# Patient Record
Sex: FEMALE | URBAN - METROPOLITAN AREA
[De-identification: names, ages, dates, MRNs, and addresses within clinical notes are randomized per-mention and may not be internally consistent; named-entity substitution may affect disease eponyms.]

---

## 2019-10-01 ENCOUNTER — CONSULT (OUTPATIENT)
Dept: URBAN - METROPOLITAN AREA CLINIC 24 | Facility: CLINIC | Age: 64
End: 2019-10-01
Payer: MEDICARE

## 2019-10-01 DIAGNOSIS — H40.033 ANATOMICAL NARROW ANGLE, BILATERAL: Primary | ICD-10-CM

## 2019-10-01 PROCEDURE — 92004 COMPRE OPH EXAM NEW PT 1/>: CPT | Performed by: OPHTHALMOLOGY

## 2019-10-01 PROCEDURE — 92133 CPTRZD OPH DX IMG PST SGM ON: CPT | Performed by: OPHTHALMOLOGY

## 2019-10-01 PROCEDURE — 92083 EXTENDED VISUAL FIELD XM: CPT | Performed by: OPHTHALMOLOGY

## 2019-10-01 PROCEDURE — 92020 GONIOSCOPY: CPT | Performed by: OPHTHALMOLOGY

## 2019-10-01 PROCEDURE — 92250 FUNDUS PHOTOGRAPHY W/I&R: CPT | Performed by: OPHTHALMOLOGY

## 2019-10-01 PROCEDURE — 76514 ECHO EXAM OF EYE THICKNESS: CPT | Performed by: OPHTHALMOLOGY

## 2019-10-01 ASSESSMENT — INTRAOCULAR PRESSURE
OD: 29
OS: 21

## 2019-12-12 ENCOUNTER — OFFICE VISIT (OUTPATIENT)
Dept: URBAN - METROPOLITAN AREA CLINIC 23 | Facility: CLINIC | Age: 64
End: 2019-12-12
Payer: MEDICARE

## 2019-12-12 PROCEDURE — 92020 GONIOSCOPY: CPT | Performed by: OPHTHALMOLOGY

## 2019-12-12 PROCEDURE — 92133 CPTRZD OPH DX IMG PST SGM ON: CPT | Performed by: OPHTHALMOLOGY

## 2019-12-12 PROCEDURE — 76514 ECHO EXAM OF EYE THICKNESS: CPT | Performed by: OPHTHALMOLOGY

## 2019-12-12 PROCEDURE — 92004 COMPRE OPH EXAM NEW PT 1/>: CPT | Performed by: OPHTHALMOLOGY

## 2019-12-12 RX ORDER — LATANOPROST 50 UG/ML
0.005 % SOLUTION OPHTHALMIC
Qty: 30 | Refills: 5 | Status: INACTIVE
Start: 2019-12-12 | End: 2020-01-27

## 2019-12-12 ASSESSMENT — INTRAOCULAR PRESSURE
OD: 20
OS: 16

## 2019-12-12 NOTE — IMPRESSION/PLAN
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 hx LPI OD in  [facility] by Dr. Nina Varela 
enlarged cupping OD
CCT average OU Iop stable OU Plan: Discussed diagnosis, explained and understood by patient. Discussed IOP/ONH/Glaucoma management and risks. OCT ordered, performed and reviewed. Continue Lantanoprost OD Qhs 
Discussed side effects of glaucoma meds. Will continue to monitor condition and symptoms.

## 2019-12-12 NOTE — IMPRESSION/PLAN
Impression: Open angle with borderline findings, low risk, left eye: H40.012. Plan: No glaucoma treatment recommended OU. Continue to monitor condition and symptoms.

## 2020-05-20 ENCOUNTER — OFFICE VISIT (OUTPATIENT)
Dept: URBAN - METROPOLITAN AREA CLINIC 23 | Facility: CLINIC | Age: 65
End: 2020-05-20
Payer: MEDICARE

## 2020-05-20 DIAGNOSIS — H40.031 ANATOMICAL NARROW ANGLE, RIGHT EYE: Primary | ICD-10-CM

## 2020-05-20 PROCEDURE — 99213 OFFICE O/P EST LOW 20 MIN: CPT | Performed by: OPHTHALMOLOGY

## 2020-05-20 RX ORDER — NETARSUDIL AND LATANOPROST OPHTHALMIC SOLUTION, 0.02%/0.005% .2; .05 MG/ML; MG/ML
SOLUTION/ DROPS OPHTHALMIC; TOPICAL
Qty: 2.5 | Refills: 11 | Status: INACTIVE
Start: 2020-05-20 | End: 2020-06-25

## 2020-05-20 ASSESSMENT — INTRAOCULAR PRESSURE
OD: 24
OS: 18

## 2020-05-20 NOTE — IMPRESSION/PLAN
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 Hx of Bradycardia LPI OD in  [facility] by Dr. Kendra Olivarez CCT average OU Plan: Discussed diagnosis, explained and understood by patient. Discussed IOP/ONH/Glaucoma management and risks. D/C Lantanoprost. Start rocklatan qhs OD. Discussed side effects of drops. Will continue to monitor condition and symptoms.

## 2020-06-23 ENCOUNTER — TESTING ONLY (OUTPATIENT)
Dept: URBAN - METROPOLITAN AREA CLINIC 23 | Facility: CLINIC | Age: 65
End: 2020-06-23
Payer: MEDICARE

## 2020-06-23 PROCEDURE — 92083 EXTENDED VISUAL FIELD XM: CPT | Performed by: OPHTHALMOLOGY

## 2020-06-25 ENCOUNTER — OFFICE VISIT (OUTPATIENT)
Dept: URBAN - METROPOLITAN AREA CLINIC 23 | Facility: CLINIC | Age: 65
End: 2020-06-25
Payer: MEDICARE

## 2020-06-25 PROCEDURE — 99213 OFFICE O/P EST LOW 20 MIN: CPT | Performed by: OPHTHALMOLOGY

## 2020-06-25 RX ORDER — LATANOPROST 50 UG/ML
0.005 % SOLUTION OPHTHALMIC
Qty: 30 | Refills: 5 | Status: INACTIVE
Start: 2020-06-25 | End: 2020-06-25

## 2020-06-25 ASSESSMENT — INTRAOCULAR PRESSURE
OS: 18
OD: 20

## 2020-06-25 NOTE — IMPRESSION/PLAN
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 Hx of Bradycardia LPI OD, Pt unable to tolerate Travatan, Rocklatan, Brimonidine CCT average OU Plan: VF reviewed with patient today. Discussed diagnosis, explained and understood by patient. Discussed IOP/ONH/Glaucoma management and risks. Start Lumigan QS OD. Discussed side effects of drops. Will continue to monitor condition and symptoms.

## 2020-07-20 ENCOUNTER — OFFICE VISIT (OUTPATIENT)
Dept: URBAN - METROPOLITAN AREA CLINIC 23 | Facility: CLINIC | Age: 65
End: 2020-07-20
Payer: MEDICARE

## 2020-07-20 PROCEDURE — 99213 OFFICE O/P EST LOW 20 MIN: CPT | Performed by: OPHTHALMOLOGY

## 2020-07-20 RX ORDER — BIMATOPROST 0.1 MG/ML
0.01 % SOLUTION/ DROPS OPHTHALMIC
Qty: 5 | Refills: 11 | Status: INACTIVE
Start: 2020-07-20 | End: 2020-07-20

## 2020-07-20 ASSESSMENT — INTRAOCULAR PRESSURE
OS: 18
OD: 18

## 2020-07-20 NOTE — IMPRESSION/PLAN
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 Hx of Bradycardia LPI OD Pt unable to tolerate Travatan, Rocklatan, Brimonidine IOP effectively lowered with lumigan CCT average OU Plan: Discussed diagnosis, explained and understood by patient. Discussed IOP/ONH/Glaucoma management and risks. Continue lumgian qhs od, no drops needed os. Will continue to monitor condition and symptoms.

## 2020-11-12 ENCOUNTER — OFFICE VISIT (OUTPATIENT)
Dept: URBAN - METROPOLITAN AREA CLINIC 23 | Facility: CLINIC | Age: 65
End: 2020-11-12
Payer: MEDICARE

## 2020-11-12 DIAGNOSIS — H25.13 AGE-RELATED NUCLEAR CATARACT, BILATERAL: ICD-10-CM

## 2020-11-12 PROCEDURE — 92133 CPTRZD OPH DX IMG PST SGM ON: CPT | Performed by: OPHTHALMOLOGY

## 2020-11-12 PROCEDURE — 99214 OFFICE O/P EST MOD 30 MIN: CPT | Performed by: OPHTHALMOLOGY

## 2020-11-12 RX ORDER — TAFLUPROST 0 MG/.3ML
0.0015 % SOLUTION/ DROPS OPHTHALMIC
Qty: 7.5 | Refills: 3 | Status: INACTIVE
Start: 2020-11-12 | End: 2020-11-12

## 2020-11-12 RX ORDER — BIMATOPROST 0.1 MG/ML
0.01 % SOLUTION/ DROPS OPHTHALMIC
Qty: 5 | Refills: 11 | Status: INACTIVE
Start: 2020-11-12 | End: 2020-12-10

## 2020-11-12 ASSESSMENT — INTRAOCULAR PRESSURE
OD: 21
OS: 19

## 2020-11-12 NOTE — IMPRESSION/PLAN
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 LPI OD in [place] [facility] Hx of Bradycardia Pt unable to tolerate Travatan, Rocklatan, Brimonidine IOP effectively lowered with lumigan CCT average OU Plan: Discussed diagnosis, explained and understood by patient. Discussed IOP/ONH/Glaucoma management and risks. Continue lumgian qhs od, no drops needed os. Discussed adding drops vs laser. Unable to tolerate medications. Recommend Trabeculoplasty (SLT) OD to possibly lower IOP. Patient elects SLT . Discussed RBA's of laser trabeculoplasty .  RL=2

## 2020-12-04 ENCOUNTER — SURGERY (OUTPATIENT)
Dept: URBAN - METROPOLITAN AREA SURGERY 11 | Facility: SURGERY | Age: 65
End: 2020-12-04
Payer: MEDICARE

## 2020-12-04 PROCEDURE — 65855 TRABECULOPLASTY LASER SURG: CPT | Performed by: OPHTHALMOLOGY

## 2020-12-10 ENCOUNTER — POST-OPERATIVE VISIT (OUTPATIENT)
Dept: URBAN - METROPOLITAN AREA CLINIC 23 | Facility: CLINIC | Age: 65
End: 2020-12-10
Payer: MEDICARE

## 2020-12-10 DIAGNOSIS — Z48.810 ENCOUNTER FOR SURGICAL AFTERCARE FOLLOWING SURGERY ON A SENSE ORGAN: Primary | ICD-10-CM

## 2020-12-10 PROCEDURE — 99024 POSTOP FOLLOW-UP VISIT: CPT | Performed by: OPHTHALMOLOGY

## 2020-12-10 RX ORDER — BIMATOPROST 0.1 MG/ML
0.01 % SOLUTION/ DROPS OPHTHALMIC
Qty: 7.5 | Refills: 3 | Status: INACTIVE
Start: 2020-12-10 | End: 2021-08-17

## 2020-12-10 ASSESSMENT — INTRAOCULAR PRESSURE
OS: 21
OD: 19

## 2020-12-10 NOTE — IMPRESSION/PLAN
Impression: S/P SLT (Selective Laser Trabeculoplasty) OD - 6 Days. Encounter for surgical aftercare following surgery on a sense organ  Z48.810.  Excellent post op course   Post operative instructions reviewed - Condition is improving - Plan: Lumigan QHS OU Rx sent today to [facility]

## 2021-01-27 ENCOUNTER — OFFICE VISIT (OUTPATIENT)
Dept: URBAN - METROPOLITAN AREA CLINIC 23 | Facility: CLINIC | Age: 66
End: 2021-01-27
Payer: MEDICARE

## 2021-01-27 DIAGNOSIS — H40.012 OPEN ANGLE WITH BORDERLINE FINDINGS, LOW RISK, LEFT EYE: ICD-10-CM

## 2021-01-27 PROCEDURE — 92133 CPTRZD OPH DX IMG PST SGM ON: CPT | Performed by: OPHTHALMOLOGY

## 2021-01-27 PROCEDURE — 99214 OFFICE O/P EST MOD 30 MIN: CPT | Performed by: OPHTHALMOLOGY

## 2021-01-27 ASSESSMENT — INTRAOCULAR PRESSURE
OS: 19
OD: 18

## 2021-01-27 NOTE — IMPRESSION/PLAN
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 LPI OD in [facility] Hx of Bradycardia Pt unable to tolerate Travatan, Rocklatan, Brimonidine IOP effectively lowered with lumigan CCT average OU
s/p laser SLT OD 12/04/2020 Plan: Discussed diagnosis, explained and understood by patient. Discussed IOP/ONH/Glaucoma management and risks. Continue lumgian qhs od, no drops needed os, IOP stable. We will continue to monitor .

## 2021-08-17 ENCOUNTER — OFFICE VISIT (OUTPATIENT)
Dept: URBAN - METROPOLITAN AREA CLINIC 29 | Facility: CLINIC | Age: 66
End: 2021-08-17
Payer: MEDICARE

## 2021-08-17 DIAGNOSIS — H40.1111 PRIMARY OPEN-ANGLE GLAUCOMA, RIGHT EYE, MILD STAGE: ICD-10-CM

## 2021-08-17 PROCEDURE — 99214 OFFICE O/P EST MOD 30 MIN: CPT | Performed by: OPHTHALMOLOGY

## 2021-08-17 PROCEDURE — 92083 EXTENDED VISUAL FIELD XM: CPT | Performed by: OPHTHALMOLOGY

## 2021-08-17 RX ORDER — BIMATOPROST 0.1 MG/ML
0.01 % SOLUTION/ DROPS OPHTHALMIC
Qty: 7.5 | Refills: 4 | Status: INACTIVE
Start: 2021-08-17 | End: 2021-11-11

## 2021-08-17 ASSESSMENT — INTRAOCULAR PRESSURE
OS: 19
OD: 17

## 2021-08-17 NOTE — IMPRESSION/PLAN
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 LPI OD in [facility] Hx of Bradycardia Pt unable to tolerate Travatan, Rocklatan, Brimonidine IOP effectively lowered with lumigan CCT average OU
s/p laser SLT OD 12/04/2020 Plan: Discussed diagnosis, explained and understood by patient. Discussed IOP/ONH/Glaucoma management and risks. Continue lumgian qhs od and start qhs os. VF ordered and reviewed today shows OD stable, OS progression. Will continue to monitor .

## 2021-12-01 ENCOUNTER — TESTING ONLY (OUTPATIENT)
Dept: URBAN - METROPOLITAN AREA CLINIC 23 | Facility: CLINIC | Age: 66
End: 2021-12-01
Payer: MEDICARE

## 2021-12-01 DIAGNOSIS — H52.4 PRESBYOPIA: Primary | ICD-10-CM

## 2021-12-01 ASSESSMENT — KERATOMETRY
OS: 44.25
OD: 44.25

## 2021-12-01 ASSESSMENT — VISUAL ACUITY
OD: 20/30
OS: 20/30

## 2022-01-03 ENCOUNTER — OFFICE VISIT (OUTPATIENT)
Dept: URBAN - METROPOLITAN AREA CLINIC 23 | Facility: CLINIC | Age: 67
End: 2022-01-03
Payer: MEDICARE

## 2022-01-03 PROCEDURE — 99213 OFFICE O/P EST LOW 20 MIN: CPT | Performed by: OPHTHALMOLOGY

## 2022-01-03 PROCEDURE — 92020 GONIOSCOPY: CPT | Performed by: OPHTHALMOLOGY

## 2022-01-03 RX ORDER — BIMATOPROST 0.1 MG/ML
0.01 % SOLUTION/ DROPS OPHTHALMIC
Qty: 7.5 | Refills: 4 | Status: ACTIVE
Start: 2022-01-03

## 2022-01-03 ASSESSMENT — INTRAOCULAR PRESSURE
OD: 21
OS: 19

## 2022-01-03 NOTE — IMPRESSION/PLAN
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 LPI OD in [facility] Hx of Bradycardia Pt unable to tolerate Travatan, Rocklatan, Brimonidine IOP effectively lowered with lumigan CCT average OU
s/p laser SLT OD 12/04/2020 Plan: Discussed diagnosis, explained and understood by patient. Discussed IOP/ONH/Glaucoma management and risks. Continue lumigan qhs od, no drops needed OS. Will continue to monitor condition and symptoms.

## 2022-04-28 ENCOUNTER — OFFICE VISIT (OUTPATIENT)
Dept: URBAN - METROPOLITAN AREA CLINIC 23 | Facility: CLINIC | Age: 67
End: 2022-04-28
Payer: MEDICARE

## 2022-04-28 DIAGNOSIS — H40.031 ANATOMICAL NARROW ANGLE, RIGHT EYE: Primary | ICD-10-CM

## 2022-04-28 PROCEDURE — 92133 CPTRZD OPH DX IMG PST SGM ON: CPT | Performed by: OPHTHALMOLOGY

## 2022-04-28 PROCEDURE — 99213 OFFICE O/P EST LOW 20 MIN: CPT | Performed by: OPHTHALMOLOGY

## 2022-04-28 ASSESSMENT — INTRAOCULAR PRESSURE
OD: 22
OS: 18

## 2022-04-28 NOTE — IMPRESSION/PLAN
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 LPI OD in [place] Hx of Bradycardia Pt unable to tolerate Travatan, Rocklatan, Brimonidine IOP slightly elevated OD
CCT average OU
s/p laser SLT OD 12/04/2020 Plan: Discussed diagnosis, explained and understood by patient. Discussed IOP/ONH/Glaucoma management and risks. Continue lumigan qhs od, Discussed additional gtts vs laser treatment. Discussed adding drops vs laser. Patient elects SLT. Recommend Trabeculoplasty (SLT)  to possibly lower IOP.  Discussed RBA's of laser trabeculoplasty OD . RL=2

## 2022-05-20 ENCOUNTER — SURGERY (OUTPATIENT)
Dept: URBAN - METROPOLITAN AREA SURGERY 11 | Facility: SURGERY | Age: 67
End: 2022-05-20
Payer: MEDICARE

## 2022-05-20 PROCEDURE — 65855 TRABECULOPLASTY LASER SURG: CPT | Performed by: OPHTHALMOLOGY

## 2022-05-27 ENCOUNTER — POST-OPERATIVE VISIT (OUTPATIENT)
Dept: URBAN - METROPOLITAN AREA CLINIC 28 | Facility: CLINIC | Age: 67
End: 2022-05-27
Payer: MEDICARE

## 2022-05-27 DIAGNOSIS — Z48.810 ENCOUNTER FOR SURGICAL AFTERCARE FOLLOWING SURGERY ON A SENSE ORGAN: Primary | ICD-10-CM

## 2022-05-27 PROCEDURE — 99024 POSTOP FOLLOW-UP VISIT: CPT | Performed by: OPHTHALMOLOGY

## 2022-05-27 ASSESSMENT — INTRAOCULAR PRESSURE
OS: 14
OD: 17

## 2022-05-27 NOTE — IMPRESSION/PLAN
Impression: S/P SLT (Selective Laser Trabeculoplasty) OD - 7 Days. Encounter for surgical aftercare following surgery on a sense organ  Z48.810. Plan: Discussed diagnosis, explained and understood by patient. Discussed IOP/ONH/Glaucoma management and risks. Continue lumigan qhs ou. Will continue to monitor condition and symptoms.

## 2022-06-20 ENCOUNTER — OFFICE VISIT (OUTPATIENT)
Dept: URBAN - METROPOLITAN AREA CLINIC 23 | Facility: CLINIC | Age: 67
End: 2022-06-20
Payer: MEDICARE

## 2022-06-20 DIAGNOSIS — H40.031 ANATOMICAL NARROW ANGLE, RIGHT EYE: Primary | ICD-10-CM

## 2022-06-20 DIAGNOSIS — H40.1121 PRIMARY OPEN-ANGLE GLAUCOMA, LEFT EYE, MILD STAGE: ICD-10-CM

## 2022-06-20 PROCEDURE — 99213 OFFICE O/P EST LOW 20 MIN: CPT | Performed by: OPHTHALMOLOGY

## 2022-06-20 ASSESSMENT — INTRAOCULAR PRESSURE
OS: 16
OD: 17

## 2022-06-20 NOTE — IMPRESSION/PLAN
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 LPI OD in [facility] Hx of Bradycardia Pt unable to tolerate Travatan, Rocklatan, Brimonidine IOP effectively lowered with lumigan CCT average OU
s/p laser SLT OD 12/04/2020
s/p laser SLT OD 05/20/22 Plan: Discussed diagnosis, explained and understood by patient. Discussed IOP/ONH/Glaucoma management and risks. Continue lumigan qhs od, no drops needed OS. Will continue to monitor condition and symptoms. SLT was successful to keep pressure down.

## 2022-10-10 ENCOUNTER — OFFICE VISIT (OUTPATIENT)
Dept: URBAN - METROPOLITAN AREA CLINIC 23 | Facility: CLINIC | Age: 67
End: 2022-10-10
Payer: MEDICARE

## 2022-10-10 DIAGNOSIS — H40.1121 PRIMARY OPEN-ANGLE GLAUCOMA, LEFT EYE, MILD STAGE: ICD-10-CM

## 2022-10-10 DIAGNOSIS — H40.031 ANATOMICAL NARROW ANGLE, RIGHT EYE: Primary | ICD-10-CM

## 2022-10-10 PROCEDURE — 92083 EXTENDED VISUAL FIELD XM: CPT | Performed by: OPHTHALMOLOGY

## 2022-10-10 PROCEDURE — 99214 OFFICE O/P EST MOD 30 MIN: CPT | Performed by: OPHTHALMOLOGY

## 2022-10-10 ASSESSMENT — INTRAOCULAR PRESSURE
OS: 16
OD: 18

## 2022-10-10 NOTE — IMPRESSION/PLAN
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 LPI OD in [facility] Hx of Bradycardia Pt unable to tolerate Travatan, Rocklatan, Brimonidine IOP effectively lowered with lumigan SLT OD 12/04/2020 SLT OD 05/20/22 Plan: Discussed diagnosis, explained and understood by patient. Discussed IOP/ONH/Glaucoma management and risks. VF ordered and reviewed today shows improvement OU. Continue lumigan qhs od, no drops needed OS. Will continue to monitor condition and symptoms.

## 2023-02-27 ENCOUNTER — OFFICE VISIT (OUTPATIENT)
Dept: URBAN - METROPOLITAN AREA CLINIC 23 | Facility: CLINIC | Age: 68
End: 2023-02-27
Payer: MEDICARE

## 2023-02-27 DIAGNOSIS — H40.1121 PRIMARY OPEN-ANGLE GLAUCOMA, LEFT EYE, MILD STAGE: ICD-10-CM

## 2023-02-27 DIAGNOSIS — H40.031 ANATOMICAL NARROW ANGLE, RIGHT EYE: Primary | ICD-10-CM

## 2023-02-27 PROCEDURE — 92083 EXTENDED VISUAL FIELD XM: CPT | Performed by: OPHTHALMOLOGY

## 2023-02-27 PROCEDURE — 99214 OFFICE O/P EST MOD 30 MIN: CPT | Performed by: OPHTHALMOLOGY

## 2023-02-27 RX ORDER — BIMATOPROST 0.1 MG/ML
0.01 % SOLUTION/ DROPS OPHTHALMIC
Qty: 7.5 | Refills: 4 | Status: ACTIVE
Start: 2023-02-27

## 2023-02-27 ASSESSMENT — INTRAOCULAR PRESSURE
OS: 16
OD: 21

## 2023-02-27 NOTE — IMPRESSION/PLAN
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 LPI OD in [facility] Hx of Bradycardia Pt unable to tolerate Travatan, Rocklatan, Brimonidine IOP effectively lowered with lumigan SLT OD 12/04/2020 SLT OD 05/20/22 Plan: Discussed diagnosis, explained and understood by patient. Discussed IOP/ONH/Glaucoma management and risks. VF ordered and reviewed today. Continue lumigan qam ou. Will continue to monitor condition and symptoms.

## 2023-05-22 ENCOUNTER — OFFICE VISIT (OUTPATIENT)
Dept: URBAN - METROPOLITAN AREA CLINIC 23 | Facility: CLINIC | Age: 68
End: 2023-05-22
Payer: MEDICARE

## 2023-05-22 DIAGNOSIS — H40.031 ANATOMICAL NARROW ANGLE, RIGHT EYE: Primary | ICD-10-CM

## 2023-05-22 DIAGNOSIS — H40.032 ANATOMICAL NARROW ANGLE, LEFT EYE: ICD-10-CM

## 2023-05-22 PROCEDURE — 99214 OFFICE O/P EST MOD 30 MIN: CPT | Performed by: OPHTHALMOLOGY

## 2023-05-22 PROCEDURE — 92133 CPTRZD OPH DX IMG PST SGM ON: CPT | Performed by: OPHTHALMOLOGY

## 2023-05-22 PROCEDURE — 92020 GONIOSCOPY: CPT | Performed by: OPHTHALMOLOGY

## 2023-05-22 RX ORDER — PREDNISOLONE ACETATE 10 MG/ML
1 % SUSPENSION/ DROPS OPHTHALMIC
Qty: 5 | Refills: 0 | Status: ACTIVE
Start: 2023-05-22

## 2023-05-22 ASSESSMENT — INTRAOCULAR PRESSURE
OD: 21
OS: 18

## 2023-05-22 NOTE — IMPRESSION/PLAN
Impression: Anatomical narrow angle, left eye: H40.032. Plan: Discussed diagnosis, at risk for AACG, IOP/ONH/Glaucoma management and risks, explained and understood. Recommend LPI OS to prevent a sudden attack of glaucoma. Advised patient to avoid certain medications that might dilate the pupils until LPI is done. Discussed RBA's and laser procedure. Patient elects LPI OS. RL=2.  Start prednisolone qid x 7 days after laser

## 2023-05-22 NOTE — IMPRESSION/PLAN
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 LPI OD in [facility] Hx of Bradycardia Pt unable to tolerate Travatan, Rocklatan, Brimonidine IOP effectively lowered with lumigan SLT OD 12/04/2020 SLT OD 05/20/22 Plan: Discussed diagnosis, explained and understood by patient. Discussed IOP/ONH/Glaucoma management and risks. OCT ordered and reviewed today. Continue lumigan qam ou. Will continue to monitor condition and symptoms.

## 2024-01-15 NOTE — IMPRESSION/PLAN
Impression: Open angle with borderline findings, low risk, left eye: H40.012.  Plan: see note #1 LOV KXA 1/15/2024 (today)     Please review and advise

## 2024-02-29 ENCOUNTER — OFFICE VISIT (OUTPATIENT)
Dept: URBAN - METROPOLITAN AREA CLINIC 23 | Facility: CLINIC | Age: 69
End: 2024-02-29
Payer: MEDICARE

## 2024-02-29 DIAGNOSIS — H40.031 ANATOMICAL NARROW ANGLE, RIGHT EYE: Primary | ICD-10-CM

## 2024-02-29 DIAGNOSIS — H40.032 ANATOMICAL NARROW ANGLE, LEFT EYE: ICD-10-CM

## 2024-02-29 PROCEDURE — 92133 CPTRZD OPH DX IMG PST SGM ON: CPT | Performed by: OPHTHALMOLOGY

## 2024-02-29 PROCEDURE — 99213 OFFICE O/P EST LOW 20 MIN: CPT | Performed by: OPHTHALMOLOGY

## 2024-02-29 PROCEDURE — 92020 GONIOSCOPY: CPT | Performed by: OPHTHALMOLOGY

## 2024-02-29 RX ORDER — BRINZOLAMIDE 10 MG/ML
1 % SUSPENSION/ DROPS OPHTHALMIC
Qty: 10 | Refills: 11 | Status: ACTIVE
Start: 2024-02-29

## 2024-02-29 RX ORDER — BIMATOPROST 0.1 MG/ML
0.01 % SOLUTION/ DROPS OPHTHALMIC
Qty: 7.5 | Refills: 4 | Status: ACTIVE
Start: 2024-02-29

## 2024-02-29 ASSESSMENT — INTRAOCULAR PRESSURE
OS: 19
OD: 26

## 2024-03-28 ENCOUNTER — OFFICE VISIT (OUTPATIENT)
Dept: URBAN - METROPOLITAN AREA CLINIC 23 | Facility: CLINIC | Age: 69
End: 2024-03-28
Payer: MEDICARE

## 2024-03-28 DIAGNOSIS — H40.033 ANATOMICAL NARROW ANGLE, BILATERAL: Primary | ICD-10-CM

## 2024-03-28 PROCEDURE — 99214 OFFICE O/P EST MOD 30 MIN: CPT | Performed by: OPHTHALMOLOGY

## 2024-03-28 ASSESSMENT — INTRAOCULAR PRESSURE
OD: 18
OS: 18

## 2024-09-12 ENCOUNTER — OFFICE VISIT (OUTPATIENT)
Dept: URBAN - METROPOLITAN AREA CLINIC 23 | Facility: CLINIC | Age: 69
End: 2024-09-12
Payer: MEDICARE

## 2024-09-12 DIAGNOSIS — H40.012 OPEN ANGLE WITH BORDERLINE FINDINGS, LOW RISK, LEFT EYE: ICD-10-CM

## 2024-09-12 DIAGNOSIS — H25.13 AGE-RELATED NUCLEAR CATARACT, BILATERAL: ICD-10-CM

## 2024-09-12 DIAGNOSIS — H40.1112 PRIMARY OPEN-ANGLE GLAUCOMA, RIGHT EYE, MODERATE STAGE: Primary | ICD-10-CM

## 2024-09-12 PROCEDURE — 99214 OFFICE O/P EST MOD 30 MIN: CPT | Performed by: OPHTHALMOLOGY

## 2024-09-12 PROCEDURE — 92083 EXTENDED VISUAL FIELD XM: CPT | Performed by: OPHTHALMOLOGY

## 2024-09-12 ASSESSMENT — INTRAOCULAR PRESSURE
OD: 18
OS: 16

## 2025-03-19 ENCOUNTER — OFFICE VISIT (OUTPATIENT)
Dept: URBAN - METROPOLITAN AREA CLINIC 23 | Facility: CLINIC | Age: 70
End: 2025-03-19
Payer: MEDICARE

## 2025-03-19 DIAGNOSIS — H40.012 OPEN ANGLE WITH BORDERLINE FINDINGS, LOW RISK, LEFT EYE: ICD-10-CM

## 2025-03-19 DIAGNOSIS — H40.1112 PRIMARY OPEN-ANGLE GLAUCOMA, RIGHT EYE, MODERATE STAGE: Primary | ICD-10-CM

## 2025-03-19 PROCEDURE — 92133 CPTRZD OPH DX IMG PST SGM ON: CPT | Performed by: OPHTHALMOLOGY

## 2025-03-19 PROCEDURE — 99213 OFFICE O/P EST LOW 20 MIN: CPT | Performed by: OPHTHALMOLOGY

## 2025-03-19 RX ORDER — BIMATOPROST 0.1 MG/ML
0.01 % SOLUTION/ DROPS OPHTHALMIC
Qty: 7.5 | Refills: 4 | Status: ACTIVE
Start: 2025-03-19

## 2025-03-19 RX ORDER — BRINZOLAMIDE 10 MG/ML
1 % SUSPENSION/ DROPS OPHTHALMIC
Qty: 10 | Refills: 11 | Status: ACTIVE
Start: 2025-03-19

## 2025-03-19 RX ORDER — BRINZOLAMIDE 10 MG/ML
1 % SUSPENSION/ DROPS OPHTHALMIC
Qty: 10 | Refills: 11 | Status: INACTIVE
Start: 2025-03-19 | End: 2025-03-19

## 2025-03-19 ASSESSMENT — INTRAOCULAR PRESSURE
OS: 18
OD: 21

## 2025-04-21 ENCOUNTER — OFFICE VISIT (OUTPATIENT)
Dept: URBAN - METROPOLITAN AREA CLINIC 23 | Facility: CLINIC | Age: 70
End: 2025-04-21
Payer: MEDICARE

## 2025-04-21 DIAGNOSIS — H40.012 OPEN ANGLE WITH BORDERLINE FINDINGS, LOW RISK, LEFT EYE: ICD-10-CM

## 2025-04-21 DIAGNOSIS — H53.8 OTHER VISUAL DISTURBANCES: Primary | ICD-10-CM

## 2025-04-21 DIAGNOSIS — H40.1112 PRIMARY OPEN-ANGLE GLAUCOMA, RIGHT EYE, MODERATE STAGE: ICD-10-CM

## 2025-04-21 DIAGNOSIS — H04.123 DRY EYE SYNDROME OF BILATERAL LACRIMAL GLANDS: ICD-10-CM

## 2025-04-21 DIAGNOSIS — H25.13 AGE-RELATED NUCLEAR CATARACT, BILATERAL: ICD-10-CM

## 2025-04-21 PROCEDURE — 99203 OFFICE O/P NEW LOW 30 MIN: CPT

## 2025-04-21 ASSESSMENT — INTRAOCULAR PRESSURE
OD: 19
OS: 22

## 2025-04-21 ASSESSMENT — KERATOMETRY
OD: 45.00
OS: 45.00

## 2025-05-14 ENCOUNTER — OFFICE VISIT (OUTPATIENT)
Dept: URBAN - METROPOLITAN AREA CLINIC 23 | Facility: CLINIC | Age: 70
End: 2025-05-14
Payer: MEDICARE

## 2025-05-14 DIAGNOSIS — H25.13 AGE-RELATED NUCLEAR CATARACT, BILATERAL: ICD-10-CM

## 2025-05-14 DIAGNOSIS — H40.1112 PRIMARY OPEN-ANGLE GLAUCOMA, RIGHT EYE, MODERATE STAGE: Primary | ICD-10-CM

## 2025-05-14 PROCEDURE — 99213 OFFICE O/P EST LOW 20 MIN: CPT | Performed by: OPHTHALMOLOGY

## 2025-05-14 ASSESSMENT — INTRAOCULAR PRESSURE
OD: 18
OS: 18